# Patient Record
Sex: MALE | Race: OTHER | HISPANIC OR LATINO
[De-identification: names, ages, dates, MRNs, and addresses within clinical notes are randomized per-mention and may not be internally consistent; named-entity substitution may affect disease eponyms.]

---

## 2019-12-30 ENCOUNTER — TRANSCRIPTION ENCOUNTER (OUTPATIENT)
Age: 44
End: 2019-12-30

## 2019-12-30 ENCOUNTER — INPATIENT (INPATIENT)
Facility: HOSPITAL | Age: 44
LOS: 0 days | Discharge: HOME | DRG: 343 | End: 2019-12-31
Attending: SURGERY | Admitting: SURGERY
Payer: COMMERCIAL

## 2019-12-30 VITALS
OXYGEN SATURATION: 99 % | HEART RATE: 84 BPM | DIASTOLIC BLOOD PRESSURE: 80 MMHG | RESPIRATION RATE: 20 BRPM | SYSTOLIC BLOOD PRESSURE: 122 MMHG | HEIGHT: 64 IN | TEMPERATURE: 98 F | WEIGHT: 145.06 LBS

## 2019-12-30 LAB
ALBUMIN SERPL ELPH-MCNC: 4.3 G/DL — SIGNIFICANT CHANGE UP (ref 3.3–5.2)
ALP SERPL-CCNC: 61 U/L — SIGNIFICANT CHANGE UP (ref 40–120)
ALT FLD-CCNC: 48 U/L — HIGH
ANION GAP SERPL CALC-SCNC: 14 MMOL/L — SIGNIFICANT CHANGE UP (ref 5–17)
APTT BLD: 32.1 SEC — SIGNIFICANT CHANGE UP (ref 27.5–36.3)
AST SERPL-CCNC: 30 U/L — SIGNIFICANT CHANGE UP
BASOPHILS # BLD AUTO: 0.02 K/UL — SIGNIFICANT CHANGE UP (ref 0–0.2)
BASOPHILS NFR BLD AUTO: 0.3 % — SIGNIFICANT CHANGE UP (ref 0–2)
BILIRUB SERPL-MCNC: 0.4 MG/DL — SIGNIFICANT CHANGE UP (ref 0.4–2)
BLD GP AB SCN SERPL QL: SIGNIFICANT CHANGE UP
BUN SERPL-MCNC: 20 MG/DL — SIGNIFICANT CHANGE UP (ref 8–20)
CALCIUM SERPL-MCNC: 9.3 MG/DL — SIGNIFICANT CHANGE UP (ref 8.6–10.2)
CHLORIDE SERPL-SCNC: 98 MMOL/L — SIGNIFICANT CHANGE UP (ref 98–107)
CO2 SERPL-SCNC: 25 MMOL/L — SIGNIFICANT CHANGE UP (ref 22–29)
CREAT SERPL-MCNC: 0.7 MG/DL — SIGNIFICANT CHANGE UP (ref 0.5–1.3)
EOSINOPHIL # BLD AUTO: 0.1 K/UL — SIGNIFICANT CHANGE UP (ref 0–0.5)
EOSINOPHIL NFR BLD AUTO: 1.4 % — SIGNIFICANT CHANGE UP (ref 0–6)
GLUCOSE SERPL-MCNC: 85 MG/DL — SIGNIFICANT CHANGE UP (ref 70–115)
HCT VFR BLD CALC: 44.5 % — SIGNIFICANT CHANGE UP (ref 39–50)
HGB BLD-MCNC: 14.1 G/DL — SIGNIFICANT CHANGE UP (ref 13–17)
IMM GRANULOCYTES NFR BLD AUTO: 0.3 % — SIGNIFICANT CHANGE UP (ref 0–1.5)
INR BLD: 0.98 RATIO — SIGNIFICANT CHANGE UP (ref 0.88–1.16)
LIDOCAIN IGE QN: 27 U/L — SIGNIFICANT CHANGE UP (ref 22–51)
LYMPHOCYTES # BLD AUTO: 2.17 K/UL — SIGNIFICANT CHANGE UP (ref 1–3.3)
LYMPHOCYTES # BLD AUTO: 30.8 % — SIGNIFICANT CHANGE UP (ref 13–44)
MCHC RBC-ENTMCNC: 28.4 PG — SIGNIFICANT CHANGE UP (ref 27–34)
MCHC RBC-ENTMCNC: 31.7 GM/DL — LOW (ref 32–36)
MCV RBC AUTO: 89.7 FL — SIGNIFICANT CHANGE UP (ref 80–100)
MONOCYTES # BLD AUTO: 0.67 K/UL — SIGNIFICANT CHANGE UP (ref 0–0.9)
MONOCYTES NFR BLD AUTO: 9.5 % — SIGNIFICANT CHANGE UP (ref 2–14)
NEUTROPHILS # BLD AUTO: 4.06 K/UL — SIGNIFICANT CHANGE UP (ref 1.8–7.4)
NEUTROPHILS NFR BLD AUTO: 57.7 % — SIGNIFICANT CHANGE UP (ref 43–77)
PLATELET # BLD AUTO: 202 K/UL — SIGNIFICANT CHANGE UP (ref 150–400)
POTASSIUM SERPL-MCNC: 4.2 MMOL/L — SIGNIFICANT CHANGE UP (ref 3.5–5.3)
POTASSIUM SERPL-SCNC: 4.2 MMOL/L — SIGNIFICANT CHANGE UP (ref 3.5–5.3)
PROT SERPL-MCNC: 7.7 G/DL — SIGNIFICANT CHANGE UP (ref 6.6–8.7)
PROTHROM AB SERPL-ACNC: 11.3 SEC — SIGNIFICANT CHANGE UP (ref 10–12.9)
RBC # BLD: 4.96 M/UL — SIGNIFICANT CHANGE UP (ref 4.2–5.8)
RBC # FLD: 13.7 % — SIGNIFICANT CHANGE UP (ref 10.3–14.5)
SODIUM SERPL-SCNC: 137 MMOL/L — SIGNIFICANT CHANGE UP (ref 135–145)
WBC # BLD: 7.04 K/UL — SIGNIFICANT CHANGE UP (ref 3.8–10.5)
WBC # FLD AUTO: 7.04 K/UL — SIGNIFICANT CHANGE UP (ref 3.8–10.5)

## 2019-12-30 PROCEDURE — 99285 EMERGENCY DEPT VISIT HI MDM: CPT

## 2019-12-30 PROCEDURE — 74177 CT ABD & PELVIS W/CONTRAST: CPT | Mod: 26

## 2019-12-30 RX ORDER — SODIUM CHLORIDE 9 MG/ML
1000 INJECTION INTRAMUSCULAR; INTRAVENOUS; SUBCUTANEOUS ONCE
Refills: 0 | Status: COMPLETED | OUTPATIENT
Start: 2019-12-30 | End: 2019-12-30

## 2019-12-30 RX ORDER — SIMVASTATIN 20 MG/1
40 TABLET, FILM COATED ORAL AT BEDTIME
Refills: 0 | Status: DISCONTINUED | OUTPATIENT
Start: 2019-12-30 | End: 2019-12-31

## 2019-12-30 RX ADMIN — SODIUM CHLORIDE 1000 MILLILITER(S): 9 INJECTION INTRAMUSCULAR; INTRAVENOUS; SUBCUTANEOUS at 21:10

## 2019-12-30 NOTE — ED PROVIDER NOTE - OBJECTIVE STATEMENT
43 y/o M pt with PMHx HLD presents to the ED c/o RLQ abd pain onset 2-3 days. Pain described as being constant yesterday, but now intermittent with some bloating. Non radiating, worsens with movement and coughing, but is not worse when passing BM. Did not self medicate for pain. Pt has never had any similar symptoms. No hx of abd surgeries. Denies illicit drug use, EtOH use, allergies.  Denies dysuria, hematuria, n/v/d, fever, abd masses/bulging.  No further acute complaints at this time.

## 2019-12-30 NOTE — H&P ADULT - ASSESSMENT
45 yo M w/ CT findings consistent with appendicitis    Admit to Surgery  NPO/IVF  Pain control  IV abx  Type and screen/Coags reviewed  OR for laparoscopic appendectomy, possible open

## 2019-12-30 NOTE — ED ADULT NURSE NOTE - OBJECTIVE STATEMENT
pt c/o of RLQ intermittent pain radiating into mid abd region that began on Saturday, denies n/v/d, denies urinary symptoms, presents with mild distention and tenderness to site on palpation, denies any previous episodes, pt also reports unintentional weight loss of 15lbs in three months, pt also reports history of hyperlipidemia, denies any other medical complaints at this time

## 2019-12-30 NOTE — ED PROVIDER NOTE - PHYSICAL EXAMINATION
Gen: Well appearing in NAD  Head: NC/AT  Neck: trachea midline  Resp:  No distress. Clear to auscultation b/l. No CVA tenderness.  Cardiac: Regular rate and rhythm.  GI: RLQ ttp, (+) McBurney's point sign. No rebound, no guarding. No inguinal masses/tenderness.  Ext: no deformities  Neuro:  A&O appears non focal  Skin:  Warm and dry as visualized  Psych:  Normal affect and mood

## 2019-12-30 NOTE — H&P ADULT - NSHPPHYSICALEXAM_GEN_ALL_CORE
Vital Signs Last 24 Hrs  T(C): 36.7 (30 Dec 2019 17:53), Max: 36.7 (30 Dec 2019 17:53)  T(F): 98.1 (30 Dec 2019 17:53), Max: 98.1 (30 Dec 2019 17:53)  HR: 84 (30 Dec 2019 17:53) (84 - 84)  BP: 122/80 (30 Dec 2019 17:53) (122/80 - 122/80)  BP(mean): --  RR: 20 (30 Dec 2019 17:53) (20 - 20)  SpO2: 99% (30 Dec 2019 17:53) (99% - 99%)    PE  Gen: AOX3, NAD  Pulm: Non labored breathing  CV: RRR, s1 and s2  Abd: Soft, ND, moderate TTP in RLQ at Mcburney's point, no rebound or guarding  Ext: Moving all 4 extremities  Vasc: +2 DP/PT pulses bilaterally  Neuro: Good affect

## 2019-12-30 NOTE — ED ADULT NURSE REASSESSMENT NOTE - NS ED NURSE REASSESS COMMENT FT1
Pt returned to unit from test, offers no complaints, POC discussed with pt and spouse, awaiting test results, pt verbalize understanding and agree, pt safety and comfort maintained.

## 2019-12-30 NOTE — H&P ADULT - HISTORY OF PRESENT ILLNESS
HPI: 45 yo M w/ CC of abdominal pain. Onset: 2-3 days ago. Never experienced this type of pain before. Localized to the RLQ. Pain is sharp in quality, constant, getting progressively worse. Denies nausea/vomiting. Last BM was yesterday - normal in color and consistency, non bloody. Voiding at baseline, denies burning. Denies fevers/chills. Complains of an unintentional weight loss of 15 pounds in the last 3 months. Never has had a c-scope before.     Pmhx: HLD  Pshx: Denies  Meds: Meloxicam, Simvastatin  Allergies: NKDA  Shx: Denies x 3 HPI: 43 yo M w/ CC of abdominal pain. Onset: 2-3 days ago. Never experienced this type of pain before. Localized to the RLQ. Pain is sharp in quality, intermittent, getting progressively worse. Denies nausea/vomiting. Last BM was today - normal in color and consistency, non bloody. Voiding at baseline, denies burning. Denies fevers/chills. States he has had a weight loss of 15 pounds in the last 3 months due to diet modifications. Never has had a c-scope before.     Pmhx: HLD  Pshx: Denies  Meds: Meloxicam, Simvastatin  Allergies: NKDA  Shx: Denies x 3

## 2019-12-30 NOTE — ED ADULT TRIAGE NOTE - WEIGHT IN LBS
[FreeTextEntry1] : Dear Dr. Vincent Heredia,\par \par I had the pleasure of seeing your patient SHAWN MADRIGAL in the office today.  My office note is attached.\par \par Thank you very much for allowing me to participate in the care of your patient.\par \par Sincerely,\par \par Dmitriy Mccann M.D., FACG, FACP\par Director, Celiac Program at [facility]\par  of Medicine\par [employer] [facility] [place] Practice Director,\par [facility] Gastroenterology/Internal Medicine at [place]\[facility] [place] [address]\[place] [address]\par Tel: [phone]\par Email: [email or web address] \par \par The attached note has been created using a voice recognition system (Dragon).  There may be some misspellings and typos.  Please call my office if you have any issues or questions. 
145

## 2019-12-30 NOTE — H&P ADULT - NSHPLABSRESULTS_GEN_ALL_CORE
I&O's Detail      LABS:                        14.1   7.04  )-----------( 202      ( 30 Dec 2019 21:24 )             44.5     12-30    137  |  98  |  20.0  ----------------------------<  85  4.2   |  25.0  |  0.70    Ca    9.3      30 Dec 2019 21:24    TPro  7.7  /  Alb  4.3  /  TBili  0.4  /  DBili  x   /  AST  30  /  ALT  48<H>  /  AlkPhos  61  12-30    PT/INR - ( 30 Dec 2019 21:24 )   PT: 11.3 sec;   INR: 0.98 ratio         PTT - ( 30 Dec 2019 21:24 )  PTT:32.1 sec      RADIOLOGY & ADDITIONAL STUDIES:

## 2019-12-31 ENCOUNTER — RESULT REVIEW (OUTPATIENT)
Age: 44
End: 2019-12-31

## 2019-12-31 VITALS
DIASTOLIC BLOOD PRESSURE: 79 MMHG | HEART RATE: 80 BPM | OXYGEN SATURATION: 97 % | RESPIRATION RATE: 20 BRPM | SYSTOLIC BLOOD PRESSURE: 123 MMHG

## 2019-12-31 DIAGNOSIS — K35.890 OTHER ACUTE APPENDICITIS WITHOUT PERFORATION OR GANGRENE: ICD-10-CM

## 2019-12-31 PROCEDURE — 74177 CT ABD & PELVIS W/CONTRAST: CPT

## 2019-12-31 PROCEDURE — 71045 X-RAY EXAM CHEST 1 VIEW: CPT | Mod: 26

## 2019-12-31 PROCEDURE — 83690 ASSAY OF LIPASE: CPT

## 2019-12-31 PROCEDURE — 88304 TISSUE EXAM BY PATHOLOGIST: CPT

## 2019-12-31 PROCEDURE — 85027 COMPLETE CBC AUTOMATED: CPT

## 2019-12-31 PROCEDURE — 85730 THROMBOPLASTIN TIME PARTIAL: CPT

## 2019-12-31 PROCEDURE — 93010 ELECTROCARDIOGRAM REPORT: CPT

## 2019-12-31 PROCEDURE — 36415 COLL VENOUS BLD VENIPUNCTURE: CPT

## 2019-12-31 PROCEDURE — 99285 EMERGENCY DEPT VISIT HI MDM: CPT | Mod: 25

## 2019-12-31 PROCEDURE — 96374 THER/PROPH/DIAG INJ IV PUSH: CPT | Mod: XU

## 2019-12-31 PROCEDURE — 44970 LAPAROSCOPY APPENDECTOMY: CPT

## 2019-12-31 PROCEDURE — 86850 RBC ANTIBODY SCREEN: CPT

## 2019-12-31 PROCEDURE — 93005 ELECTROCARDIOGRAM TRACING: CPT

## 2019-12-31 PROCEDURE — 80053 COMPREHEN METABOLIC PANEL: CPT

## 2019-12-31 PROCEDURE — 86900 BLOOD TYPING SEROLOGIC ABO: CPT

## 2019-12-31 PROCEDURE — 71045 X-RAY EXAM CHEST 1 VIEW: CPT

## 2019-12-31 PROCEDURE — 88304 TISSUE EXAM BY PATHOLOGIST: CPT | Mod: 26

## 2019-12-31 PROCEDURE — 86901 BLOOD TYPING SEROLOGIC RH(D): CPT

## 2019-12-31 PROCEDURE — 85610 PROTHROMBIN TIME: CPT

## 2019-12-31 RX ORDER — SIMVASTATIN 20 MG/1
1 TABLET, FILM COATED ORAL
Qty: 0 | Refills: 0 | DISCHARGE

## 2019-12-31 RX ORDER — FENTANYL CITRATE 50 UG/ML
50 INJECTION INTRAVENOUS
Refills: 0 | Status: DISCONTINUED | OUTPATIENT
Start: 2019-12-31 | End: 2019-12-31

## 2019-12-31 RX ORDER — ONDANSETRON 8 MG/1
4 TABLET, FILM COATED ORAL ONCE
Refills: 0 | Status: DISCONTINUED | OUTPATIENT
Start: 2019-12-31 | End: 2019-12-31

## 2019-12-31 RX ORDER — FENTANYL CITRATE 50 UG/ML
25 INJECTION INTRAVENOUS
Refills: 0 | Status: DISCONTINUED | OUTPATIENT
Start: 2019-12-31 | End: 2019-12-31

## 2019-12-31 RX ORDER — SODIUM CHLORIDE 9 MG/ML
1000 INJECTION, SOLUTION INTRAVENOUS
Refills: 0 | Status: DISCONTINUED | OUTPATIENT
Start: 2019-12-31 | End: 2019-12-31

## 2019-12-31 RX ORDER — CEFOTETAN DISODIUM 1 G
2 VIAL (EA) INJECTION EVERY 12 HOURS
Refills: 0 | Status: DISCONTINUED | OUTPATIENT
Start: 2019-12-31 | End: 2019-12-31

## 2019-12-31 RX ORDER — CEFOTETAN DISODIUM 1 G
VIAL (EA) INJECTION
Refills: 0 | Status: DISCONTINUED | OUTPATIENT
Start: 2019-12-31 | End: 2019-12-31

## 2019-12-31 RX ORDER — MELOXICAM 15 MG/1
1 TABLET ORAL
Qty: 0 | Refills: 0 | DISCHARGE

## 2019-12-31 RX ORDER — IBUPROFEN 200 MG
600 TABLET ORAL ONCE
Refills: 0 | Status: COMPLETED | OUTPATIENT
Start: 2019-12-31 | End: 2019-12-31

## 2019-12-31 RX ORDER — TRAMADOL HYDROCHLORIDE 50 MG/1
1 TABLET ORAL
Qty: 20 | Refills: 0
Start: 2019-12-31

## 2019-12-31 RX ORDER — METOCLOPRAMIDE HCL 10 MG
10 TABLET ORAL ONCE
Refills: 0 | Status: DISCONTINUED | OUTPATIENT
Start: 2019-12-31 | End: 2019-12-31

## 2019-12-31 RX ORDER — CEFOTETAN DISODIUM 1 G
2 VIAL (EA) INJECTION ONCE
Refills: 0 | Status: COMPLETED | OUTPATIENT
Start: 2019-12-31 | End: 2019-12-31

## 2019-12-31 RX ADMIN — FENTANYL CITRATE 25 MICROGRAM(S): 50 INJECTION INTRAVENOUS at 06:00

## 2019-12-31 RX ADMIN — FENTANYL CITRATE 25 MICROGRAM(S): 50 INJECTION INTRAVENOUS at 05:00

## 2019-12-31 RX ADMIN — Medication 600 MILLIGRAM(S): at 06:45

## 2019-12-31 RX ADMIN — Medication 600 MILLIGRAM(S): at 05:46

## 2019-12-31 RX ADMIN — SODIUM CHLORIDE 125 MILLILITER(S): 9 INJECTION, SOLUTION INTRAVENOUS at 04:06

## 2019-12-31 RX ADMIN — FENTANYL CITRATE 50 MICROGRAM(S): 50 INJECTION INTRAVENOUS at 04:05

## 2019-12-31 RX ADMIN — FENTANYL CITRATE 25 MICROGRAM(S): 50 INJECTION INTRAVENOUS at 05:47

## 2019-12-31 RX ADMIN — FENTANYL CITRATE 50 MICROGRAM(S): 50 INJECTION INTRAVENOUS at 04:20

## 2019-12-31 RX ADMIN — Medication 100 GRAM(S): at 00:57

## 2019-12-31 RX ADMIN — FENTANYL CITRATE 25 MICROGRAM(S): 50 INJECTION INTRAVENOUS at 04:44

## 2019-12-31 RX ADMIN — SODIUM CHLORIDE 120 MILLILITER(S): 9 INJECTION, SOLUTION INTRAVENOUS at 00:45

## 2019-12-31 NOTE — ED ADULT NURSE REASSESSMENT NOTE - NS ED NURSE REASSESS COMMENT FT1
Report given to accepting OR RN Lynnette, POC discussed with pt and spouse who verbalize understanding and agree wit plan, Pre-Op checklist completed, pt awaiting transport at this time.

## 2019-12-31 NOTE — ASU DISCHARGE PLAN (ADULT/PEDIATRIC) - ASU DC SPECIAL INSTRUCTIONSFT
please call the number provided to make a follow up appointment within 2 weeks.  Do not lift anything heavier than 15Ibs for the next 6 weeks  Do not remove steristrips  take pain medication as prescribed  place ice pack over wound for better control

## 2019-12-31 NOTE — ASU DISCHARGE PLAN (ADULT/PEDIATRIC) - CARE PROVIDER_API CALL
Sandrine Clark)  Surgery; Surgical Critical Care  [address] [place] [address]  Phone: [phone]  Fax: [phone]  Follow Up Time: 2 weeks

## 2019-12-31 NOTE — ASU DISCHARGE PLAN (ADULT/PEDIATRIC) - CALL YOUR DOCTOR IF YOU HAVE ANY OF THE FOLLOWING:
Bleeding that does not stop/Pain not relieved by Medications/Fever greater than (need to indicate Fahrenheit or Celsius)/Nausea and vomiting that does not stop/Wound/Surgical Site with redness, or foul smelling discharge or pus

## 2020-01-02 PROBLEM — E78.5 HYPERLIPIDEMIA, UNSPECIFIED: Chronic | Status: ACTIVE | Noted: 2019-12-30

## 2020-01-03 PROBLEM — Z00.00 ENCOUNTER FOR PREVENTIVE HEALTH EXAMINATION: Status: ACTIVE | Noted: 2020-01-03

## 2020-01-11 LAB — SURGICAL PATHOLOGY STUDY: SIGNIFICANT CHANGE UP

## 2020-01-14 ENCOUNTER — APPOINTMENT (OUTPATIENT)
Dept: TRAUMA SURGERY | Facility: CLINIC | Age: 45
End: 2020-01-14
Payer: COMMERCIAL

## 2020-01-14 VITALS
DIASTOLIC BLOOD PRESSURE: 72 MMHG | RESPIRATION RATE: 16 BRPM | SYSTOLIC BLOOD PRESSURE: 113 MMHG | HEIGHT: 65.5 IN | OXYGEN SATURATION: 99 % | BODY MASS INDEX: 24.85 KG/M2 | WEIGHT: 151 LBS | TEMPERATURE: 98.1 F | HEART RATE: 72 BPM

## 2020-01-14 DIAGNOSIS — Z82.61 FAMILY HISTORY OF ARTHRITIS: ICD-10-CM

## 2020-01-14 DIAGNOSIS — Z86.39 PERSONAL HISTORY OF OTHER ENDOCRINE, NUTRITIONAL AND METABOLIC DISEASE: ICD-10-CM

## 2020-01-14 DIAGNOSIS — Z78.9 OTHER SPECIFIED HEALTH STATUS: ICD-10-CM

## 2020-01-14 DIAGNOSIS — Z90.49 ACQUIRED ABSENCE OF OTHER SPECIFIED PARTS OF DIGESTIVE TRACT: ICD-10-CM

## 2020-01-14 PROCEDURE — 99024 POSTOP FOLLOW-UP VISIT: CPT

## 2020-01-14 NOTE — ASSESSMENT
[FreeTextEntry1] : RTC prn  \par any acute  symptoms  and  or  concerns  call back [facility] or  go  directly to HED\par RTW 01/14/2020

## 2020-01-14 NOTE — PHYSICAL EXAM
[Abdominal Masses] : No abdominal masses [Abdomen Tenderness] : ~T ~M No abdominal tenderness [No Rash or Lesion] : No rash or lesion [Alert] : alert [Oriented to Person] : oriented to person [Oriented to Time] : oriented to time [Oriented to Place] : oriented to place [Calm] : calm [de-identified] : wdwn  male  in  nad   [de-identified] : non icteric s clers  PERRLA EOMS intact  and  nl [de-identified] : trachea   midline [de-identified] : soft  non tender  no distension   all incisions  c/d/i   no signs  of  seroma  no  signs  of  cellulitis no  guarding   no rebound

## 2022-02-27 ENCOUNTER — EMERGENCY (EMERGENCY)
Facility: HOSPITAL | Age: 47
LOS: 1 days | Discharge: HOME | End: 2022-02-27
Attending: EMERGENCY MEDICINE
Payer: COMMERCIAL

## 2022-02-27 VITALS
WEIGHT: 167.99 LBS | HEIGHT: 66 IN | TEMPERATURE: 99 F | HEART RATE: 130 BPM | OXYGEN SATURATION: 97 % | DIASTOLIC BLOOD PRESSURE: 88 MMHG | RESPIRATION RATE: 16 BRPM | SYSTOLIC BLOOD PRESSURE: 127 MMHG

## 2022-02-27 VITALS — HEART RATE: 98 BPM

## 2022-02-27 LAB
APPEARANCE UR: ABNORMAL
BACTERIA # UR AUTO: ABNORMAL
BILIRUB UR-MCNC: NEGATIVE — SIGNIFICANT CHANGE UP
C DIFF BY PCR RESULT: SIGNIFICANT CHANGE UP
C DIFF TOX GENS STL QL NAA+PROBE: SIGNIFICANT CHANGE UP
COLOR SPEC: YELLOW — SIGNIFICANT CHANGE UP
CULTURE RESULTS: SIGNIFICANT CHANGE UP
CULTURE RESULTS: SIGNIFICANT CHANGE UP
DIFF PNL FLD: ABNORMAL
EPI CELLS # UR: SIGNIFICANT CHANGE UP
GLUCOSE UR QL: NEGATIVE MG/DL — SIGNIFICANT CHANGE UP
KETONES UR-MCNC: NEGATIVE — SIGNIFICANT CHANGE UP
LEUKOCYTE ESTERASE UR-ACNC: NEGATIVE — SIGNIFICANT CHANGE UP
NITRITE UR-MCNC: NEGATIVE — SIGNIFICANT CHANGE UP
PH UR: 5 — SIGNIFICANT CHANGE UP (ref 5–8)
PROT UR-MCNC: 30 MG/DL
RBC CASTS # UR COMP ASSIST: ABNORMAL /HPF (ref 0–4)
SP GR SPEC: 1.02 — SIGNIFICANT CHANGE UP (ref 1.01–1.02)
SPECIMEN SOURCE: SIGNIFICANT CHANGE UP
UROBILINOGEN FLD QL: NEGATIVE MG/DL — SIGNIFICANT CHANGE UP
WBC UR QL: SIGNIFICANT CHANGE UP /HPF (ref 0–5)

## 2022-02-27 PROCEDURE — 99283 EMERGENCY DEPT VISIT LOW MDM: CPT

## 2022-02-27 PROCEDURE — 81001 URINALYSIS AUTO W/SCOPE: CPT

## 2022-02-27 PROCEDURE — 99053 MED SERV 10PM-8AM 24 HR FAC: CPT

## 2022-02-27 PROCEDURE — 87493 C DIFF AMPLIFIED PROBE: CPT

## 2022-02-27 PROCEDURE — 87507 IADNA-DNA/RNA PROBE TQ 12-25: CPT

## 2022-02-27 PROCEDURE — 87086 URINE CULTURE/COLONY COUNT: CPT

## 2022-02-27 PROCEDURE — 99284 EMERGENCY DEPT VISIT MOD MDM: CPT

## 2022-02-27 RX ORDER — LOPERAMIDE HCL 2 MG
2 TABLET ORAL ONCE
Refills: 0 | Status: COMPLETED | OUTPATIENT
Start: 2022-02-27 | End: 2022-02-27

## 2022-02-27 RX ORDER — IBUPROFEN 200 MG
600 TABLET ORAL ONCE
Refills: 0 | Status: COMPLETED | OUTPATIENT
Start: 2022-02-27 | End: 2022-02-27

## 2022-02-27 RX ORDER — LOPERAMIDE HCL 2 MG
1 TABLET ORAL
Qty: 10 | Refills: 0
Start: 2022-02-27 | End: 2022-03-03

## 2022-02-27 RX ORDER — ONDANSETRON 8 MG/1
1 TABLET, FILM COATED ORAL
Qty: 20 | Refills: 0
Start: 2022-02-27

## 2022-02-27 RX ORDER — ONDANSETRON 8 MG/1
8 TABLET, FILM COATED ORAL ONCE
Refills: 0 | Status: COMPLETED | OUTPATIENT
Start: 2022-02-27 | End: 2022-02-27

## 2022-02-27 RX ADMIN — ONDANSETRON 8 MILLIGRAM(S): 8 TABLET, FILM COATED ORAL at 09:12

## 2022-02-27 RX ADMIN — Medication 600 MILLIGRAM(S): at 09:12

## 2022-02-27 RX ADMIN — Medication 2 MILLIGRAM(S): at 10:19

## 2022-02-27 NOTE — ED ADULT NURSE NOTE - OBJECTIVE STATEMENT
Pt A&Ox4, NAD. Pt presents to the ED with complaints of N/V/D x2 days. Breathing even and unlabored.

## 2022-02-27 NOTE — ED ADULT TRIAGE NOTE - CHIEF COMPLAINT QUOTE
Pt presents to ED for eval of nausea, vomiting, and diarrhea for the past two days. Pt also c/o upper abd pain.

## 2022-02-27 NOTE — ED PROVIDER NOTE - ATTENDING CONTRIBUTION TO CARE
pleasant adult male with gastroenteritis; on exam, NAD, well appearing; no icterus; normal oral mucosa; abd soft nt nd; agree with acp plan of care, rx's and meds as ordered

## 2022-02-27 NOTE — ED PROVIDER NOTE - PATIENT PORTAL LINK FT
You can access the FollowMyHealth Patient Portal offered by [facility] by registering at the following website: [email or web address]. By joining [facility]’s FollowMyHealth portal, you will also be able to view your health information using other applications (apps) compatible with our system.

## 2022-02-27 NOTE — ED PROVIDER NOTE - NS ED ROS FT
No fever/chills, No photophobia/eye pain/changes in vision, No ear pain/sore throat/dysphagia, No chest pain/palpitations, no SOB/cough/wheeze/stridor, No abdominal pain, +N/V/D, no dysuria/frequency/discharge, No neck/back pain, no rash, no changes in neurological status/function.

## 2022-02-27 NOTE — ED PROVIDER NOTE - PROGRESS NOTE DETAILS
tachycardia resolved, patient able to tolerate PO, abd soft NT ND, supportive care given likely viral gastro, UA shows no signs of ketones or infection

## 2022-02-27 NOTE — ED PROVIDER NOTE - NSFOLLOWUPINSTRUCTIONS_ED_ALL_ED_FT
Vomiting is very common in children. Vomiting causes food and liquid to come up from the stomach and out of the mouth or nose. Vomiting can cause your child to lose too much fluid and salt from his body. This is called dehydration. Dehydration can be a dangerous condition for your child. When a child is dehydrated, his body and organs such as the heart may not work normally. You can help prevent your child from becoming dehydrated by giving him enough liquids to replace vomited fluid. It is important to call your child's caregiver if you think your child is becoming dehydrated.  There are many causes of vomiting. A common cause in children over one year old is gastroenteritis, or the "stomach flu". The stomach flu is caused by germs that infect the lining of the stomach and intestines. Other causes of vomiting are problems with the muscles surrounding your baby's stomach. These problems may be called pyloric stenosis or gastroesophageal reflux disease (GERD). Your child may also have vomiting because of food poisoning, infections in other body organs, or a head injury. Sometimes, the cause of your child's vomiting is unknown.  Picture of the digestive system of a child  AFTER YOU LEAVE:  Medicines:  Keep a current list of your child's medicines: Include the amounts, and when, how, and why they are taken. Bring the list and the medicines in their containers to follow-up visits. Carry your child's medicine list with you in case of an emergency. Throw away old medicine lists. Give vitamins, herbs, or food supplements only as directed.  Give your child's medicine as directed: Call your child's primary healthcare provider if you think the medicine is not working as expected. Tell him if your child is allergic to any medicine. Ask before you change or stop giving your child his medicines.  Do not give your child any over-the-counter (OTC) medicines for his vomiting unless his caregiver tells you to. If you are told to give your child a medicine, follow the caregiver's instructions carefully.  How can I take care of my child at home?  Help your child to rest until he feels better.  Call your child's caregiver if your child shows signs of dehydration.  A baby may be dehydrated if he wets five or less diapers during a 24 hour time period. A dehydrated baby may have a dry mouth and cracked lips, and may cry with few or no tears. A baby with worsening dehydration may act sleepier, weaker, or fussier than usual. The baby's eyes and soft spot on top of his head may be sunken if he is dehydrated. He may also have wrinkled skin, and pale hands and feet.  A child may be dehydrated if he has a dry mouth, cracked lips, cries without tears, or is dizzy. A dehydrated child may be sleepier, fussier, and weaker than usual. He may be very thirsty and will urinate less often than usual.  Give your child plenty of liquids.  The best way to prevent dehydration is to give your child plenty of fluids, even if he is still occasionally vomiting. The best fluids to give your child contain a mixture of salt, sugar, minerals, and nutrients in water. These are called oral rehydration solutions (ORS). Many brands are available at [employer] stores. Ask your child's caregiver which brand you should buy.  Give your baby 1 to 2 teaspoons of ORS every five minutes. Older children can begin with small sips of ORS often. Use a spoon, syringe, cup, or bottle to feed ORS to your child. If your child does not vomit the ORS, slowly give your child more ORS. Encourage but do not force your child to drink.  Continue giving your baby formula or breast milk throughout his illness, or follow his caregiver's instructions. Your child can start eating foods when he is ready. Start slowly with bland food such as cooked cereal, rice, noodles, bananas, crackers, applesauce, or toast. If he does not have problems with soft, bland foods, slowly begin to serve him regular foods.  Put your baby or young child on his stomach or side whenever he is lying down. This may stop him from breathing vomit into his airways and lungs.  Save your extra breast milk. If you are breast feeding your child, keep offering him breast milk. If your child is drinking less than usual, pump your breasts after feedings. Store the extra milk in the freezer so that your child can drink it later. Ask your child's caregiver for information about pumping, storing, and freezing your breast milk.  Wash your and your child's hands often with soap and warm water. Handwashing may help you and your child to prevent spreading germs to others. Wash your hands after changing diapers and before fixing food. Your child and all family members should wash their hands before touching food and eating. Everyone should wash their hands after going to the bathroom.    Diarrhea    Diarrhea is frequent loose or watery bowel movements that has many causes. Diarrhea can make you feel weak and cause you to become dehydrated. Diarrhea typically lasts 2–3 days, but can last longer if it is a sign of something more serious. Drink clear fluids to prevent dehydration. Eat bland, easy-to-digest foods as tolerated.     SEEK IMMEDIATE MEDICAL CARE IF YOU HAVE ANY OF THE FOLLOWING SYMPTOMS: high fevers, lightheadedness/dizziness, chest pain, black or bloody stools, shortness of breath, severe abdominal or back pain, or any signs of dehydration.

## 2022-02-27 NOTE — ED PROVIDER NOTE - OBJECTIVE STATEMENT
This is a 46 year old male here for n/v/d x 2 days.  He notes total of vomiting, 3 episodes in total, diarhea every 2 hours.  He denies any sick contacts, or travel.  He notes pmhx HLDA.  He reports appendectomy.  He notes no smoking, or drinking or drug use.  He reports PCP [place].  He reports fully vaccinated for covid x 3.  He denies flu vaccine.  He has been taking tylenol and imodium.  He reports this morning took tylenol 2 tabs.

## 2022-02-28 LAB
CULTURE RESULTS: SIGNIFICANT CHANGE UP
SPECIMEN SOURCE: SIGNIFICANT CHANGE UP

## 2022-09-26 NOTE — HISTORY OF PRESENT ILLNESS
Called the patient.     Relayed the below note from Dr. Morgan to the patient.     Patient verbalized understanding, and lab visit scheduled for 10/12/22.    [place] RN on 9/26/2022 at 4:36 PM     [de-identified] : Patient presents  to [facility]  clinic  for  post op exam  s/p laparoscopic appendectomy  Patient  at time  of  this  exam deneis any  fevers no chils  no  n/v/d    nl bm nl urination   nl appetite no fatigue

## 2023-11-14 NOTE — ASU DISCHARGE PLAN (ADULT/PEDIATRIC) - A. DRIVE A CAR, OPERATE POWER TOOLS OR MACHINERY
Start medrol dose pack. Avoid NSAIDs while on this such as ibuprofen, Aleve, motrin, etc. Okay to take tylenol with this up to 3000 mg/max dose per day. Start Robaxin 500 mg three times a day as needed. This medication can make you drowsy. If you have any concerning side effects, please call the office to discuss. Obtain neck xrays. Start physical therapy. Statement Selected

## 2025-07-01 NOTE — ED ADULT NURSE NOTE - CARDIO ASSESSMENT
July 1, 2025    Thank you for allowing our Cardiology team to participate in your care.     Please note the following changes to your heart treatment plan:     Medication changes:   - increase fluid intake to goal 2-3L/day    Tests to be done:  - ZioPatch monitor     Follow up:  - Follow up as needed      For scheduling, please call [phone].      Please contact our team through [employer] or our Nurse Team Voicemail service [phone] for questions or concerns.     General Clinic [phone]     If you are having a medical emergency, please call 911.     Sincerely,    Johnnie Mason MD, FACC  Cardiology    [facility] and [facility] [place]    
WDL